# Patient Record
Sex: MALE | Employment: FULL TIME | URBAN - METROPOLITAN AREA
[De-identification: names, ages, dates, MRNs, and addresses within clinical notes are randomized per-mention and may not be internally consistent; named-entity substitution may affect disease eponyms.]

---

## 2017-01-18 PROCEDURE — 87086 URINE CULTURE/COLONY COUNT: CPT | Performed by: FAMILY MEDICINE

## 2017-04-04 ENCOUNTER — APPOINTMENT (OUTPATIENT)
Dept: GENERAL RADIOLOGY | Facility: HOSPITAL | Age: 62
End: 2017-04-04
Attending: EMERGENCY MEDICINE
Payer: COMMERCIAL

## 2017-04-04 ENCOUNTER — HOSPITAL ENCOUNTER (EMERGENCY)
Facility: HOSPITAL | Age: 62
Discharge: HOME | End: 2017-04-04
Attending: EMERGENCY MEDICINE
Payer: COMMERCIAL

## 2017-04-04 VITALS
HEIGHT: 72 IN | DIASTOLIC BLOOD PRESSURE: 105 MMHG | OXYGEN SATURATION: 96 % | BODY MASS INDEX: 31.15 KG/M2 | WEIGHT: 230 LBS | HEART RATE: 101 BPM | SYSTOLIC BLOOD PRESSURE: 148 MMHG | RESPIRATION RATE: 22 BRPM | TEMPERATURE: 98 F

## 2017-04-04 DIAGNOSIS — I47.1 SVT (SUPRAVENTRICULAR TACHYCARDIA) (HCC): Primary | ICD-10-CM

## 2017-04-04 PROCEDURE — 93010 ELECTROCARDIOGRAM REPORT: CPT

## 2017-04-04 PROCEDURE — 96374 THER/PROPH/DIAG INJ IV PUSH: CPT

## 2017-04-04 PROCEDURE — 71010 XR CHEST AP PORTABLE  (CPT=71010): CPT | Performed by: EMERGENCY MEDICINE

## 2017-04-04 PROCEDURE — 80053 COMPREHEN METABOLIC PANEL: CPT | Performed by: EMERGENCY MEDICINE

## 2017-04-04 PROCEDURE — 85025 COMPLETE CBC W/AUTO DIFF WBC: CPT | Performed by: EMERGENCY MEDICINE

## 2017-04-04 PROCEDURE — 99285 EMERGENCY DEPT VISIT HI MDM: CPT

## 2017-04-04 PROCEDURE — 93005 ELECTROCARDIOGRAM TRACING: CPT

## 2017-04-04 PROCEDURE — 84484 ASSAY OF TROPONIN QUANT: CPT | Performed by: EMERGENCY MEDICINE

## 2017-04-04 PROCEDURE — 96361 HYDRATE IV INFUSION ADD-ON: CPT

## 2017-04-04 PROCEDURE — 84443 ASSAY THYROID STIM HORMONE: CPT | Performed by: EMERGENCY MEDICINE

## 2017-04-04 RX ORDER — ASPIRIN 81 MG/1
324 TABLET, CHEWABLE ORAL ONCE
Status: COMPLETED | OUTPATIENT
Start: 2017-04-04 | End: 2017-04-04

## 2017-04-04 RX ORDER — ADENOSINE 3 MG/ML
6 INJECTION, SOLUTION INTRAVENOUS ONCE
Status: COMPLETED | OUTPATIENT
Start: 2017-04-04 | End: 2017-04-04

## 2017-04-04 RX ORDER — ADENOSINE 3 MG/ML
INJECTION, SOLUTION INTRAVENOUS
Status: COMPLETED
Start: 2017-04-04 | End: 2017-04-04

## 2017-04-04 NOTE — ED INITIAL ASSESSMENT (HPI)
Pt states his heart began racing racing about 20 min prior to arrival.  Pt denies pain.   Pt denies SOB

## 2017-04-04 NOTE — ED PROVIDER NOTES
Patient Seen in: [facility] Emergency Department    History   Patient presents with:  Chest Pain Angina (cardiovascular)    Stated Complaint: chest pain    HPI    62 male presents to the emergent part with complaints that he is feeling like his hear and negative except as noted above. PSFH elements reviewed from today and agreed except as otherwise stated in HPI.     Physical Exam       ED Triage Vitals   BP 04/04/17 1451 148/102 mmHg   Pulse 04/04/17 1425 197   Resp 04/04/17 1451 19   Temp 04/04/17 following orders were created for panel order CBC WITH DIFFERENTIAL WITH PLATELET.   Procedure                               Abnormality         Status                     ---------                               -----------         ------ would require an inpatient admission. He understands the if he signs out [address] that he may return at any time and that we are more than willing to continue to care for him. I did advise that he take an aspirin daily.   He was subsequently

## 2018-08-20 PROBLEM — F41.9 ANXIETY: Status: ACTIVE | Noted: 2018-08-20

## 2018-08-20 PROBLEM — I10 ESSENTIAL HYPERTENSION: Status: ACTIVE | Noted: 2018-08-20

## 2018-09-19 PROCEDURE — 81003 URINALYSIS AUTO W/O SCOPE: CPT | Performed by: FAMILY MEDICINE

## 2021-06-14 ENCOUNTER — HOSPITAL ENCOUNTER (OUTPATIENT)
Facility: HOSPITAL | Age: 66
Setting detail: OBSERVATION
Discharge: HOME | End: 2021-06-15
Attending: EMERGENCY MEDICINE | Admitting: INTERNAL MEDICINE
Payer: COMMERCIAL

## 2021-06-14 ENCOUNTER — APPOINTMENT (OUTPATIENT)
Dept: MRI IMAGING | Facility: HOSPITAL | Age: 66
End: 2021-06-14
Attending: EMERGENCY MEDICINE
Payer: COMMERCIAL

## 2021-06-14 ENCOUNTER — APPOINTMENT (OUTPATIENT)
Dept: CT IMAGING | Facility: HOSPITAL | Age: 66
End: 2021-06-14
Attending: EMERGENCY MEDICINE
Payer: COMMERCIAL

## 2021-06-14 ENCOUNTER — APPOINTMENT (OUTPATIENT)
Dept: GENERAL RADIOLOGY | Facility: HOSPITAL | Age: 66
End: 2021-06-14
Attending: EMERGENCY MEDICINE
Payer: COMMERCIAL

## 2021-06-14 DIAGNOSIS — G45.9 TIA (TRANSIENT ISCHEMIC ATTACK): ICD-10-CM

## 2021-06-14 DIAGNOSIS — R53.1 RIGHT SIDED WEAKNESS: Primary | ICD-10-CM

## 2021-06-14 DIAGNOSIS — R20.0 RIGHT SIDED NUMBNESS: ICD-10-CM

## 2021-06-14 DIAGNOSIS — I10 HYPERTENSION, UNSPECIFIED TYPE: ICD-10-CM

## 2021-06-14 PROCEDURE — 71045 X-RAY EXAM CHEST 1 VIEW: CPT | Performed by: EMERGENCY MEDICINE

## 2021-06-14 PROCEDURE — 70549 MR ANGIOGRAPH NECK W/O&W/DYE: CPT | Performed by: EMERGENCY MEDICINE

## 2021-06-14 PROCEDURE — 70553 MRI BRAIN STEM W/O & W/DYE: CPT | Performed by: EMERGENCY MEDICINE

## 2021-06-14 PROCEDURE — 70450 CT HEAD/BRAIN W/O DYE: CPT | Performed by: EMERGENCY MEDICINE

## 2021-06-14 PROCEDURE — 70546 MR ANGIOGRAPH HEAD W/O&W/DYE: CPT | Performed by: EMERGENCY MEDICINE

## 2021-06-14 RX ORDER — LABETALOL HYDROCHLORIDE 5 MG/ML
10 INJECTION, SOLUTION INTRAVENOUS EVERY 10 MIN PRN
Status: DISCONTINUED | OUTPATIENT
Start: 2021-06-14 | End: 2021-06-15

## 2021-06-14 RX ORDER — DOCUSATE SODIUM 100 MG/1
100 CAPSULE, LIQUID FILLED ORAL 2 TIMES DAILY PRN
Status: DISCONTINUED | OUTPATIENT
Start: 2021-06-14 | End: 2021-06-15

## 2021-06-14 RX ORDER — ACETAMINOPHEN 650 MG/1
650 SUPPOSITORY RECTAL EVERY 4 HOURS PRN
Status: DISCONTINUED | OUTPATIENT
Start: 2021-06-14 | End: 2021-06-15

## 2021-06-14 RX ORDER — HEPARIN SODIUM 5000 [USP'U]/ML
5000 INJECTION, SOLUTION INTRAVENOUS; SUBCUTANEOUS EVERY 8 HOURS SCHEDULED
Status: DISCONTINUED | OUTPATIENT
Start: 2021-06-14 | End: 2021-06-15

## 2021-06-14 RX ORDER — ONDANSETRON 2 MG/ML
4 INJECTION INTRAMUSCULAR; INTRAVENOUS EVERY 6 HOURS PRN
Status: DISCONTINUED | OUTPATIENT
Start: 2021-06-14 | End: 2021-06-15

## 2021-06-14 RX ORDER — HYDRALAZINE HYDROCHLORIDE 20 MG/ML
10 INJECTION INTRAMUSCULAR; INTRAVENOUS EVERY 2 HOUR PRN
Status: DISCONTINUED | OUTPATIENT
Start: 2021-06-14 | End: 2021-06-15

## 2021-06-14 RX ORDER — ASPIRIN 325 MG
325 TABLET ORAL DAILY
Status: DISCONTINUED | OUTPATIENT
Start: 2021-06-15 | End: 2021-06-15

## 2021-06-14 RX ORDER — SODIUM CHLORIDE 9 MG/ML
INJECTION, SOLUTION INTRAVENOUS CONTINUOUS
Status: DISCONTINUED | OUTPATIENT
Start: 2021-06-14 | End: 2021-06-15

## 2021-06-14 RX ORDER — ASPIRIN 325 MG
325 TABLET ORAL ONCE
Status: COMPLETED | OUTPATIENT
Start: 2021-06-14 | End: 2021-06-14

## 2021-06-14 RX ORDER — ASPIRIN 300 MG
300 SUPPOSITORY, RECTAL RECTAL DAILY
Status: DISCONTINUED | OUTPATIENT
Start: 2021-06-15 | End: 2021-06-15

## 2021-06-14 RX ORDER — ACETAMINOPHEN 325 MG/1
650 TABLET ORAL EVERY 4 HOURS PRN
Status: DISCONTINUED | OUTPATIENT
Start: 2021-06-14 | End: 2021-06-15

## 2021-06-14 RX ORDER — METOCLOPRAMIDE HYDROCHLORIDE 5 MG/ML
10 INJECTION INTRAMUSCULAR; INTRAVENOUS EVERY 8 HOURS PRN
Status: DISCONTINUED | OUTPATIENT
Start: 2021-06-14 | End: 2021-06-15

## 2021-06-14 RX ORDER — ATORVASTATIN CALCIUM 40 MG/1
40 TABLET, FILM COATED ORAL NIGHTLY
Status: DISCONTINUED | OUTPATIENT
Start: 2021-06-14 | End: 2021-06-15

## 2021-06-14 NOTE — ED INITIAL ASSESSMENT (HPI)
Pt to ED with right sided facial numbness and adn right arm numbness x 5 minutes that is now completely resolved.

## 2021-06-14 NOTE — ED PROVIDER NOTES
Patient Seen in: [facility] Emergency Department      History   Patient presents with:  Numbness Weakness    Stated Complaint: Pt to ED with right sided facial numbness and adn right arm numbness x 5 minute*    HPI/Subjective:   HPI    62-year-old m systems reviewed and negative except as noted above.     Physical Exam     ED Triage Vitals   BP 06/14/21 1445 (!) 192/90   Pulse 06/14/21 1445 58   Resp 06/14/21 1445 10   Temp 06/14/21 1452 97.2 °F (36.2 °C)   Temp src 06/14/21 1452 Temporal   SpO2 06/14/ ---------                               -----------         ------                     CBC W/ DIFFERENTIAL[[id number]]                              Final result                 Please view results for these tests on the individual orders.    URINALYSIS WIT Disposition and Plan     Clinical Impression:  Right sided weakness  (primary encounter diagnosis)  Right sided numbness  TIA (transient ischemic attack)  Hypertension, unspecified type   Bradycardia  Disposition:  Admit  6/14/2021  8:29 pm    Foll

## 2021-06-15 ENCOUNTER — TELEPHONE (OUTPATIENT)
Dept: NEUROLOGY | Facility: CLINIC | Age: 66
End: 2021-06-15

## 2021-06-15 ENCOUNTER — APPOINTMENT (OUTPATIENT)
Dept: CV DIAGNOSTICS | Facility: HOSPITAL | Age: 66
End: 2021-06-15
Attending: HOSPITALIST
Payer: COMMERCIAL

## 2021-06-15 VITALS
SYSTOLIC BLOOD PRESSURE: 143 MMHG | HEART RATE: 57 BPM | TEMPERATURE: 99 F | OXYGEN SATURATION: 95 % | DIASTOLIC BLOOD PRESSURE: 79 MMHG | RESPIRATION RATE: 13 BRPM | HEIGHT: 72 IN | BODY MASS INDEX: 30.28 KG/M2 | WEIGHT: 223.56 LBS

## 2021-06-15 PROCEDURE — 99204 OFFICE O/P NEW MOD 45 MIN: CPT | Performed by: OTHER

## 2021-06-15 PROCEDURE — 93306 TTE W/DOPPLER COMPLETE: CPT | Performed by: HOSPITALIST

## 2021-06-15 RX ORDER — LISINOPRIL 5 MG/1
5 TABLET ORAL DAILY
Status: DISCONTINUED | OUTPATIENT
Start: 2021-06-15 | End: 2021-06-15

## 2021-06-15 RX ORDER — ATORVASTATIN CALCIUM 40 MG/1
40 TABLET, FILM COATED ORAL NIGHTLY
Qty: 30 TABLET | Refills: 3 | Status: SHIPPED | OUTPATIENT
Start: 2021-06-15 | End: 2021-10-06

## 2021-06-15 RX ORDER — ASPIRIN 325 MG
325 TABLET ORAL DAILY
Qty: 30 TABLET | Refills: 0 | Status: SHIPPED | OUTPATIENT
Start: 2021-06-16 | End: 2021-07-07

## 2021-06-15 RX ORDER — LISINOPRIL 5 MG/1
5 TABLET ORAL DAILY
Qty: 30 TABLET | Refills: 3 | Status: SHIPPED | OUTPATIENT
Start: 2021-06-15 | End: 2021-10-06

## 2021-06-15 NOTE — TELEPHONE ENCOUNTER
TIA CLINIC SCREENING    1. Date of ED visit/TIA diagnosis:  6/14/2021   Time of discharge from ED:  n/a    2. Is patient currently admitted? Yes   If YES - TIA Clinic Appointment not required.       3. Does patient already see an [facility] neurologist?  Yes  Nam

## 2021-06-15 NOTE — CM/SW NOTE
Pt is a 74 yo male admitted for right sided weakness. Pt on CVA protocol. Pt was +TIA. Pt lives with his wife. PT/OT to see - no dc needs anticipated.   / to remain available for support and/or discharge planning.      06/15/21

## 2021-06-15 NOTE — OCCUPATIONAL THERAPY NOTE
OCCUPATIONAL THERAPY QUICK EVALUATION - INPATIENT    Room Number: 6836/9027-X  Evaluation Date: 6/15/2021     Type of Evaluation: Quick Eval  Presenting Problem: R sided weakness    Physician Order: IP Consult to Occupational Therapy  Reason for Therapy: COGNITION  Pt demonstrated appropriate cognition and ability to verbalize all needs and requests.     RANGE OF MOTION AND STRENGTH ASSESSMENT  Upper extremity ROM is within functional limits    Upper extremity strength is within functional limits   Chair: Mod I    Functional Mobility: Mod I with no assistive device necessary    Balance  Static Sitting: Mod I  Dynamic Sitting: Mod I for sit to stand from EOB  Static Standing: Mod I  Dynamic Standing:  Mod I for grooming task standing upright at sink has been evaluated and presents with no skilled Occupational Therapy needs at this time. Patient discharged from Occupational Therapy services. Please re-order if a new functional limitation presents during this admission.     Patient was able to achieve

## 2021-06-15 NOTE — SLP NOTE
ADULT SWALLOWING EVALUATION    ASSESSMENT    ASSESSMENT/OVERALL IMPRESSION:  Order received per protocol; chart reviewed. Patient is a 73 y.o makle who presented to ED yesterday with right facial and RUE numbness. Pt reported symptoms have resolved.  Pt den Within Functional Limits     Range of Motion: Within Functional Limits  Rate of Motion: Within Functional Limits    Voice Quality: Clear  Respiratory Status: Unlabored  Consistencies Trialed: Thin liquids; Hard solid  Method of Presentation: Self presentati

## 2021-06-15 NOTE — PHYSICAL THERAPY NOTE
PHYSICAL THERAPY QUICK EVALUATION - INPATIENT    Room Number: 6190/4806-Z  Evaluation Date: 6/15/2021  Presenting Problem: R sided face numbness and RUE numbness   Physician Order: PT Eval and Treat    History related to current admission: Patient is a 7 SURGERY  '68   • KNEE ARTHROSCOPY  '84    left   • OTHER SURGICAL HISTORY      Lf knee scope   • OTHER SURGICAL HISTORY  '06    [place] teeth   • TONSILLECTOMY  '74       HOME SITUATION  Type of Home: House   Home Layout: Multi-level;Bed/bath upstairs  Stai room?: None   -   Climbing 3-5 steps with a railing?: None       AM-PAC Score:  Raw Score: 24   Approx Degree of Impairment: 0%   Standardized Score (AM-PAC Scale): 61.14   CMS Modifier (G-Code): CH      FUNCTIONAL ABILITY STATUS  Gait Assessment  Gait Ass presents during this admission. GOALS  Patient was able to achieve the following goals . ..     Patient was able to transfer At previous, functional level   Patient able to ambulate on level surfaces At previous, functional level   Patient able to stair c

## 2021-06-15 NOTE — PLAN OF CARE
NURSING ADMISSION NOTE  Marlyn Haney  5/8/1955    Patient admitted via cart. Oriented to room. Safety precautions initiated. Bed in low position. Call light in reach.   /89 (BP Location: Right arm)   Pulse 51   Temp 98.1 °F (36.7 °C) (Oral)

## 2021-06-15 NOTE — H&P
[facility] Hospitalist History and Physical      Patient presents with:  Numbness Weakness       PCP: Sina Riley DO      History of Present Illness: Patient is a 73year old male with PMH sig for HTN, HL presents for eval of R facial and arm numbness/weakness normal. Teeth and gums normal.   Neck: Supple, symmetrical, trachea midline, no cervical or supraclavicular lymph adenopathy, thyroid: no enlargment/tenderness/nodules appreciated   Lungs:   Clear to auscultation bilaterally.  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 There is dense calcification of the distal left vertebral artery V4 segment as well without aneurysmal enlargement.   There is mucosal thickening involving t T2-weighted images with FLAIR sequences and diffusion weighted images without and with infusion.   MR angiography of the brain without and with infusion and MR angiography of the neck without and with infusion was performed using 3D time of flight, multi-pl of the branch vessels appear widely patent. The bilateral subclavian arteries and innominate artery are unremarkable. The common carotid arteries are widely patent.   Moderate atherosclerotic plaque in right carotid bulb resulting in up to 55% stenosis by lisinopril given roney, start statin  - ECHO pending  - neuro to eval    # sinus bradycardia  - dc BB, start ace    SCDs    Dispo:  Possible home later today pending echo and neuro eval      Outpatient records or previous hospital records reviewed.    HECTOR h

## 2021-06-15 NOTE — CONSULTS
[facility]    Report of Consultation    Ziyad Baez Patient Status:  Inpatient    1955 MRN [id number]   [facility] 7NE-A Attending Gil De Leon MD   [address] Day [address] 1 PCP Sina Riley DO     Date of Admission:   history of known TIA or stroke.  Premorbid mRS- 0   ABCD2 score- 2-3    History:  Past Medical History:   Diagnosis Date   • Arrhythmia    • Cataract    • Essential hypertension    • High blood pressure    • High cholesterol    • Hyperlipidemia      Past [place] distress. HEENT:  Normocephalic, atraumatic  LUNGS: Clear to auscultation bilaterally. HEART:  S1, S2, Regular rate and rhythm. NEUROLOGICAL:  This patient is alert and orientated x 3. Speech fluent. Able to follow simple commands.   Face is symmetrica

## 2021-06-15 NOTE — PLAN OF CARE
NURSING DISCHARGE NOTE    Discharged Home via Wheelchair. Accompanied by Family member and Support staff  Belongings Taken by patient/family.     Received patient A/Ox4, RA, SB on tele [id number]  Neuro q4, no deficits  NIH 0  Denying pain  Up ad jeanne to ba related to functional status, cognitive ability or social support system  Outcome: Adequate for Discharge

## 2021-06-15 NOTE — PROGRESS NOTES
[id number] [facility] Neurology Preliminary Note    Cee Waldron Patient Status:  Inpatient    1955 MRN [id number]   [facility] 7NE-A Attending Wicho Aguilar MD   [address] Day [address] 1 PCP Erich Yip, [address] [place] [address] Drive drugs. ALLERGIES:    Penicillins                 MEDICATIONS:  atorvastatin 40 MG Oral Tab, Take 1 tablet (40 mg total) by mouth nightly. lisinopril 5 MG Oral Tab, Take 1 tablet (5 mg total) by mouth daily.       lisinopril tab 5 mg, 5 mg, Oral, Daily leg weakness,. Sensory: Intact to light touch  Coordination: FTN intact without tremor or dysmetria  Gait: deferred    IMAGING:    MRI brain, MRA H/N: CONCLUSION:         1. No acute intracranial abnormality identified.       2.  Minimal chronic microvasc

## 2021-06-25 PROBLEM — R53.1 RIGHT SIDED WEAKNESS: Status: RESOLVED | Noted: 2021-06-14 | Resolved: 2021-06-25

## 2021-06-25 PROBLEM — R20.0 RIGHT SIDED NUMBNESS: Status: RESOLVED | Noted: 2021-06-14 | Resolved: 2021-06-25

## 2021-06-25 PROBLEM — I10 HYPERTENSION, UNSPECIFIED TYPE: Status: RESOLVED | Noted: 2021-06-14 | Resolved: 2021-06-25

## 2021-07-07 RX ORDER — ASPIRIN 325 MG
325 TABLET ORAL DAILY
Qty: 30 TABLET | Refills: 0 | Status: SHIPPED | OUTPATIENT
Start: 2021-07-07

## 2021-07-07 NOTE — TELEPHONE ENCOUNTER
Pt not seen in office/does not have appointment scheduled to date. LMTCB. 30 day supply pended and routed to provider.

## 2021-08-04 RX ORDER — ASPIRIN 325 MG
325 TABLET ORAL DAILY
Qty: 30 TABLET | Refills: 0 | OUTPATIENT
Start: 2021-08-04

## 2021-09-13 RX ORDER — ASPIRIN 325 MG
325 TABLET ORAL DAILY
Qty: 30 TABLET | Refills: 0 | OUTPATIENT
Start: 2021-09-13

## 2021-09-13 NOTE — TELEPHONE ENCOUNTER
Denied Rx Medication: ASPIRIN 325 MG Oral Tab    Patient need to schedule follow up appointment for further refills on medication. Sent patient message via "[employer]

## 2022-05-09 RX ORDER — ASPIRIN 325 MG
325 TABLET ORAL DAILY
Qty: 30 TABLET | Refills: 0 | OUTPATIENT
Start: 2022-05-09

## (undated) NOTE — ED AVS SNAPSHOT
[facility] Emergency Department    [place]  [address] [address]    Phone:  [phone]    Fax:  [address] [facility]   MRN: [id number]    Department:  [facility] Emergency Department   Date of Visit:  4/4/ IF THERE IS ANY CHANGE OR WORSENING OF YOUR CONDITION, CALL YOUR PRIMARY CARE PHYSICIAN AT ONCE OR RETURN IMMEDIATELY TO THE EMERGENCY DEPARTMENT.     If you have been prescribed any medication(s), please fill your prescription right away and begin taking t

## (undated) NOTE — ED AVS SNAPSHOT
[facility] Emergency Department    [place]  [address] [address]    Phone:  [phone]    Fax:  [address]   MRN: [id number]    Department:  [facility] Emergency Department   Date of Visit:  4/4/ covered by your plan. Please contact your insurance company to determine coverage for follow-up care and referrals.     [address] [place] [phone]  Pediatric [phone] Emergency Department   [phone] to by a radiologist.  If there is a significant change in your reading, you will be contacted. Please make sure we have your correct phone number before you leave. After you leave, you should follow the attached instructions.      I have read and understand th [address].         Imaging Results         XR CHEST AP PORTABLE  (CPT=71010) (Final result) Result time:  04/04/17 15:15:44    Final result    Impression:    PROCEDURE:  XR CHEST AP PORTABLE (CPT=71010)     TECHNIQUE:  AP chest radiograph was o

## (undated) NOTE — LETTER
06/15/21          To Whom It May Concern,    Please Excuse Cornell Rajan from work effective now until Monday, June 21st, 2021.      Thank Pepe Varghese RN

## (undated) NOTE — LETTER
Date & Time: 4/4/2017, 4:13 PM  Patient: Lashawn Spence  Attending Provider: Farhat Mason MD      This certifies that Sherita Murray, a patient at an [facility] facility, am leaving the facility voluntarily and against the adv